# Patient Record
Sex: FEMALE | Race: WHITE | NOT HISPANIC OR LATINO | Employment: PART TIME | URBAN - METROPOLITAN AREA
[De-identification: names, ages, dates, MRNs, and addresses within clinical notes are randomized per-mention and may not be internally consistent; named-entity substitution may affect disease eponyms.]

---

## 2019-03-24 ENCOUNTER — HOSPITAL ENCOUNTER (EMERGENCY)
Facility: OTHER | Age: 29
Discharge: HOME | End: 2019-03-24
Attending: OBSTETRICS & GYNECOLOGY
Payer: COMMERCIAL

## 2019-03-24 VITALS
TEMPERATURE: 97 F | WEIGHT: 164 LBS | HEART RATE: 109 BPM | DIASTOLIC BLOOD PRESSURE: 83 MMHG | RESPIRATION RATE: 18 BRPM | SYSTOLIC BLOOD PRESSURE: 138 MMHG | OXYGEN SATURATION: 100 % | BODY MASS INDEX: 28 KG/M2 | HEIGHT: 64 IN

## 2019-03-24 DIAGNOSIS — O36.8190 DECREASED FETAL MOVEMENT DURING PREGNANCY, ANTEPARTUM, SINGLE OR UNSPECIFIED FETUS: Primary | ICD-10-CM

## 2019-03-24 PROCEDURE — 99282 EMERGENCY DEPT VISIT SF MDM: CPT

## 2019-03-24 NOTE — NURSING
Pt presents to [facility] with c/o decreased fetal movement. Pt states that although she knows she's only 23 weeks she had been feeling baby move a lot the last couple of weeks, and the last two days hasn't felt any movement. Denies vaginal bleeding or leaking of fluids. Reports occasional mild cramps. Roomed to [facility]. VSS. FHTs verified= 135. Dr. Cruz notified.

## 2019-03-24 NOTE — DISCHARGE INSTRUCTIONS
Labor Precautions  Return if you experience contractions, uterine tightening or cramps(more than 4 in 1 hour for 2 consecutive hours)  Backache that comes and goes  Pelvic pressure  Change in vaginal discharge  Vaginal Bleeding  Leaking of fluid  Call the OB Clinic([phone] during regular business hours or L&D([phone]) after hours for any questions or concerns  Keep previously scheduled clinic appointment  Drink 8-10 glasses of water a day

## 2019-03-24 NOTE — ED PROVIDER NOTES
Encounter Date: 3/24/2019       History     Chief Complaint   Patient presents with    Decreased Fetal Movement     29 y.o.  at 23w0d by stated dating who presents for decreased fetal movement. She is from [place] and here for vacation. Denies Vb, LOF, or contractions. States she was told she has an anterior placenta. Denies any PMH, PSH. States last pregnancy uncomplicated.       Review of patient's allergies indicates:  No Known Allergies  No past medical history on file.  No past surgical history on file.  No family history on file.  Social History     Tobacco Use    Smoking status: Not on file   Substance Use Topics    Alcohol use: Not on file    Drug use: Not on file     Review of Systems   Constitutional: Negative for chills, fatigue and fever.   HENT: Negative for congestion.    Eyes: Negative for visual disturbance.   Respiratory: Negative for cough and shortness of breath.    Cardiovascular: Negative for chest pain and palpitations.   Gastrointestinal: Negative for abdominal distention, abdominal pain, constipation, diarrhea, nausea and vomiting.   Genitourinary: Negative for difficulty urinating, dysuria, hematuria, vaginal bleeding and vaginal discharge.   Skin: Negative for rash.   Neurological: Negative for dizziness, seizures, light-headedness and headaches.   Hematological: Does not bruise/bleed easily.   Psychiatric/Behavioral: Negative for dysphoric mood. The patient is not nervous/anxious.        Physical Exam     Initial Vitals   BP Pulse Resp Temp SpO2   19 0624 19 0624 19 0624 19 0624 19 0627   138/83 (!) 113 18 96.9 °F (36.1 °C) 100 %      MAP       --                Physical Exam    Vitals reviewed.  Constitutional: She appears well-developed and well-nourished. She is not diaphoretic. No distress.   Cardiovascular: Normal rate, regular rhythm, normal heart sounds and intact distal pulses.   Pulmonary/Chest: Breath sounds normal. No respiratory  distress.   Abdominal: Soft. She exhibits no distension. There is no tenderness.   Musculoskeletal: She exhibits no edema.   Neurological: She is alert and oriented to person, place, and time. She has normal strength. No sensory deficit.   Skin: Skin is warm and dry.   Psychiatric: She has a normal mood and affect. Her behavior is normal. Judgment and thought content normal.         ED Course   Procedures  Labs Reviewed - No data to display       Imaging Results    None          Medical Decision Making:   ED Management:  FHTs confirmed in [facility] +140s   To f/u with her primary OB once home in [place]               Attending Attestation:   Physician Attestation Statement for Resident:  As the supervising MD   Physician Attestation Statement: I have personally seen and examined this patient.   I agree with the above history. -:   As the supervising MD I agree with the above PE.    As the supervising MD I agree with the above treatment, course, plan, and disposition.  I was personally present during the critical portions of the procedure(s) performed by the resident and was immediately available in the ED to provide services and assistance as needed during the entire procedure.  I have reviewed the following: old records at this facility.                       Clinical Impression:       ICD-10-CM ICD-9-CM   1. Decreased fetal movement during pregnancy, antepartum, single or unspecified fetus O36.8190 655.73                                Shiela Bettencourt MD  03/29/19 2134